# Patient Record
Sex: MALE | Race: WHITE | Employment: OTHER | URBAN - METROPOLITAN AREA
[De-identification: names, ages, dates, MRNs, and addresses within clinical notes are randomized per-mention and may not be internally consistent; named-entity substitution may affect disease eponyms.]

---

## 2021-08-01 ENCOUNTER — HOSPITAL ENCOUNTER (EMERGENCY)
Age: 56
Discharge: HOME | End: 2021-08-01
Attending: STUDENT IN AN ORGANIZED HEALTH CARE EDUCATION/TRAINING PROGRAM
Payer: MEDICARE

## 2021-08-01 ENCOUNTER — APPOINTMENT (OUTPATIENT)
Dept: GENERAL RADIOLOGY | Age: 56
End: 2021-08-01
Payer: MEDICARE

## 2021-08-01 VITALS
BODY MASS INDEX: 18.48 KG/M2 | WEIGHT: 132 LBS | HEIGHT: 71 IN | DIASTOLIC BLOOD PRESSURE: 74 MMHG | RESPIRATION RATE: 16 BRPM | SYSTOLIC BLOOD PRESSURE: 148 MMHG | TEMPERATURE: 98.2 F | OXYGEN SATURATION: 98 % | HEART RATE: 95 BPM

## 2021-08-01 DIAGNOSIS — E11.69 TYPE 2 DIABETES MELLITUS WITH OTHER SPECIFIED COMPLICATION, UNSPECIFIED WHETHER LONG TERM INSULIN USE (HCC): ICD-10-CM

## 2021-08-01 DIAGNOSIS — S91.101A OPEN WOUND OF RIGHT GREAT TOE, INITIAL ENCOUNTER: Primary | ICD-10-CM

## 2021-08-01 LAB
ABSOLUTE EOS #: 0.06 K/UL (ref 0–0.44)
ABSOLUTE IMMATURE GRANULOCYTE: 0.04 K/UL (ref 0–0.3)
ABSOLUTE LYMPH #: 1.51 K/UL (ref 1.1–3.7)
ABSOLUTE MONO #: 0.93 K/UL (ref 0.1–1.2)
ANION GAP SERPL CALCULATED.3IONS-SCNC: 14 MMOL/L (ref 9–17)
BASOPHILS # BLD: 1 % (ref 0–2)
BASOPHILS ABSOLUTE: 0.05 K/UL (ref 0–0.2)
BNP INTERPRETATION: ABNORMAL
BUN BLDV-MCNC: 18 MG/DL (ref 6–20)
BUN/CREAT BLD: 20 (ref 9–20)
C-REACTIVE PROTEIN: 47.2 MG/L (ref 0–5)
CALCIUM SERPL-MCNC: 9.2 MG/DL (ref 8.6–10.4)
CHLORIDE BLD-SCNC: 94 MMOL/L (ref 98–107)
CO2: 22 MMOL/L (ref 20–31)
CREAT SERPL-MCNC: 0.88 MG/DL (ref 0.7–1.2)
DIFFERENTIAL TYPE: ABNORMAL
EOSINOPHILS RELATIVE PERCENT: 1 % (ref 1–4)
GFR AFRICAN AMERICAN: >60 ML/MIN
GFR NON-AFRICAN AMERICAN: >60 ML/MIN
GFR SERPL CREATININE-BSD FRML MDRD: ABNORMAL ML/MIN/{1.73_M2}
GFR SERPL CREATININE-BSD FRML MDRD: ABNORMAL ML/MIN/{1.73_M2}
GLUCOSE BLD-MCNC: 463 MG/DL (ref 70–99)
HCT VFR BLD CALC: 34 % (ref 40.7–50.3)
HEMOGLOBIN: 11.1 G/DL (ref 13–17)
IMMATURE GRANULOCYTES: 1 %
LYMPHOCYTES # BLD: 18 % (ref 24–43)
MCH RBC QN AUTO: 30.3 PG (ref 25.2–33.5)
MCHC RBC AUTO-ENTMCNC: 32.6 G/DL (ref 28.4–34.8)
MCV RBC AUTO: 92.9 FL (ref 82.6–102.9)
MONOCYTES # BLD: 11 % (ref 3–12)
NRBC AUTOMATED: 0 PER 100 WBC
PDW BLD-RTO: 13 % (ref 11.8–14.4)
PLATELET # BLD: 198 K/UL (ref 138–453)
PLATELET ESTIMATE: ABNORMAL
PMV BLD AUTO: 10.6 FL (ref 8.1–13.5)
POTASSIUM SERPL-SCNC: 4.3 MMOL/L (ref 3.7–5.3)
PRO-BNP: 472 PG/ML
RBC # BLD: 3.66 M/UL (ref 4.21–5.77)
RBC # BLD: ABNORMAL 10*6/UL
SEDIMENTATION RATE, ERYTHROCYTE: 41 MM (ref 0–20)
SEG NEUTROPHILS: 68 % (ref 36–65)
SEGMENTED NEUTROPHILS ABSOLUTE COUNT: 5.88 K/UL (ref 1.5–8.1)
SODIUM BLD-SCNC: 130 MMOL/L (ref 135–144)
WBC # BLD: 8.5 K/UL (ref 3.5–11.3)
WBC # BLD: ABNORMAL 10*3/UL

## 2021-08-01 PROCEDURE — 6370000000 HC RX 637 (ALT 250 FOR IP): Performed by: STUDENT IN AN ORGANIZED HEALTH CARE EDUCATION/TRAINING PROGRAM

## 2021-08-01 PROCEDURE — 73630 X-RAY EXAM OF FOOT: CPT

## 2021-08-01 PROCEDURE — 80048 BASIC METABOLIC PNL TOTAL CA: CPT

## 2021-08-01 PROCEDURE — 83880 ASSAY OF NATRIURETIC PEPTIDE: CPT

## 2021-08-01 PROCEDURE — 85025 COMPLETE CBC W/AUTO DIFF WBC: CPT

## 2021-08-01 PROCEDURE — 99283 EMERGENCY DEPT VISIT LOW MDM: CPT

## 2021-08-01 PROCEDURE — 85652 RBC SED RATE AUTOMATED: CPT

## 2021-08-01 PROCEDURE — 86140 C-REACTIVE PROTEIN: CPT

## 2021-08-01 RX ORDER — INSULIN GLARGINE 100 [IU]/ML
INJECTION, SOLUTION SUBCUTANEOUS NIGHTLY
COMMUNITY

## 2021-08-01 RX ORDER — PREGABALIN 75 MG/1
75 CAPSULE ORAL 2 TIMES DAILY
COMMUNITY

## 2021-08-01 RX ORDER — DOXYCYCLINE 100 MG/1
100 CAPSULE ORAL ONCE
Status: COMPLETED | OUTPATIENT
Start: 2021-08-01 | End: 2021-08-01

## 2021-08-01 RX ORDER — MORPHINE SULFATE 15 MG/1
15 TABLET ORAL EVERY 4 HOURS PRN
COMMUNITY

## 2021-08-01 RX ORDER — PANCRELIPASE 36000; 180000; 114000 [USP'U]/1; [USP'U]/1; [USP'U]/1
CAPSULE, DELAYED RELEASE PELLETS ORAL
COMMUNITY

## 2021-08-01 RX ORDER — DOXYCYCLINE HYCLATE 100 MG
100 TABLET ORAL 2 TIMES DAILY
Qty: 14 TABLET | Refills: 0 | Status: SHIPPED | OUTPATIENT
Start: 2021-08-01 | End: 2021-08-08

## 2021-08-01 RX ADMIN — DOXYCYCLINE 100 MG: 100 CAPSULE ORAL at 23:13

## 2021-08-01 ASSESSMENT — ENCOUNTER SYMPTOMS
VOMITING: 0
EYE REDNESS: 0
SHORTNESS OF BREATH: 0
BACK PAIN: 0
COLOR CHANGE: 1
NAUSEA: 0
ABDOMINAL PAIN: 0
EYE DISCHARGE: 0
ABDOMINAL PAIN: 1

## 2021-08-01 NOTE — ED TRIAGE NOTES
Pt to ED via private auto c/o R toe ulcer. Pt AOx4. States h/o DM. Noticed swelling 3 days ago. Pt denies pain d/t neuropathy. Toe red and swollen. Patent airway, no dyspnea or cyanosis noted. Afebrille. Skin warm, appropriate to hue and dry. Ambulatory to  . Bed to lowest position, side rails up x2, call light within reach.

## 2021-08-02 NOTE — ED PROVIDER NOTES
[facility]  Emergency Department Encounter     Pt Name: Flavia Cobian  MRN: [id number]  [place] 1965  Date of evaluation: 8/1/21  PCP:  Michelle Brooks MD    [address] [place] [address]       Chief Complaint   Patient presents with    Foot Swelling       HISTORY [place]  (Location/Symptom, Timing/Onset, Context/Setting, Quality, Duration, Modifying Factors,Severity.)      Flavia Cobian is a 64 y.o. male who presents with right great toe pain. He states he is diabetic does have significant diabetic neuropathy in both lower extremities. States he noticed a wound to the medial great toe on the right foot couple days ago and recently scrubbed back callus overlying it today saw a hole and has noticed some worsening pain as well as redness. States last tetanus was approximately 2 years ago. Denies fevers, chills, nausea, vomiting chest pain. States he is never seen a podiatrist.  Pain is great toe. Worse with movement. PAST MEDICAL / SURGICAL / SOCIAL / FAMILY HISTORY      has a past medical history of Diabetes mellitus ([address] and Pancreatitis. has a past surgical history that includes Tonsillectomy and hernia repair.     Social History     Socioeconomic History    Marital status: Single     Spouse name: Not on file    Number of children: Not on file    Years of education: Not on file    Highest education level: Not on file   Occupational History    Not on file   Tobacco Use    Smoking status: Former Smoker     Types: Cigarettes    Smokeless tobacco: Never Used   Substance and Sexual Activity    Alcohol use: Not Currently    Drug use: Never    Sexual activity: Not on file   Other Topics Concern    Not on file   Social History Narrative    Not on file     Social Determinants of Health     Financial Resource Strain:     Difficulty of Paying Living Expenses:    Food Insecurity:     Worried About Running Out of Food in the Last Year:     [address] in the Last Year:    Transportation Needs:     Lack of Transportation (Medical):  Lack of Transportation (Non-Medical):    Physical Activity:     Days of Exercise per Week:     Minutes of Exercise per Session:    Stress:     Feeling of Stress :    Social Connections:     Frequency of Communication with Friends and Family:     Frequency of Social Gatherings with Friends and Family:     Attends Anabaptism Services:     Active Member of Clubs or Organizations:     Attends Club or Organization Meetings:     Marital Status:    Intimate Partner Violence:     Fear of Current or Ex-Partner:     Emotionally Abused:     Physically Abused:     Sexually Abused:        History reviewed. No pertinent family history. Allergies:  Patient has no known allergies. Home Medications:  Prior to Admission medications    Medication Sig Start Date End Date Taking? Authorizing Provider   morphine (MSIR) 15 MG tablet Take 15 mg by mouth every 4 hours as needed for Pain. Yes Historical Provider, MD   pregabalin (LYRICA) 75 MG capsule Take 75 mg by mouth 2 times daily. Yes Historical Provider, MD   lipase-protease-amylase (CREON) 60772-535549 units CPEP delayed release capsule Take by mouth 3 times daily (with meals)   Yes Historical Provider, MD   insulin glargine (BASAGLAR KWIKPEN) 100 UNIT/ML injection pen Inject into the skin nightly   Yes Historical Provider, MD   insulin aspart (NOVOLOG) 100 UNIT/ML injection vial Inject into the skin 3 times daily (before meals)   Yes Historical Provider, MD   doxycycline hyclate (VIBRA-TABS) 100 MG tablet Take 1 tablet by mouth 2 times daily for 7 days 8/1/21 8/8/21 Yes Caitlin Navarro, DO       REVIEW OF SYSTEMS    (2-9 systems for level 4, 10 or more for level 5)      Review of Systems   Constitutional: Negative for chills and fever. Eyes: Negative for discharge and redness. Respiratory: Negative for shortness of breath. Cardiovascular: Negative for chest pain. Gastrointestinal: Negative for abdominal pain. Genitourinary: Negative for flank pain. Musculoskeletal: Negative for back pain. Skin: Positive for rash and wound. Neurological: Negative for headaches. Psychiatric/Behavioral: Negative for agitation and confusion. PHYSICAL EXAM   (up to 7 for level 4, 8 or more for level 5)     INITIAL VITALS:    height is 5' 11\" (1.803 m) and weight is 132 lb (59.9 kg). His oral temperature is 98.2 °F (36.8 °C). His blood pressure is 148/74 (abnormal) and his pulse is 95. His respiration is 16 and oxygen saturation is 98%. Physical Exam  Vitals and nursing note reviewed. Constitutional:       Appearance: He is well-developed. HENT:      Head: Normocephalic and atraumatic. Nose: Nose normal.      Mouth/Throat:      Mouth: Mucous membranes are moist.   Eyes:      General: No scleral icterus. Conjunctiva/sclera: Conjunctivae normal.      Pupils: Pupils are equal, round, and reactive to light. Neck:      Trachea: No tracheal deviation. Cardiovascular:      Rate and Rhythm: Normal rate and regular rhythm. Heart sounds: Normal heart sounds. No murmur heard. No friction rub. No gallop. Pulmonary:      Effort: Pulmonary effort is normal. No respiratory distress. Breath sounds: Normal breath sounds. No wheezing or rales. Abdominal:      General: Bowel sounds are normal. There is no distension. Palpations: Abdomen is soft. There is no mass. Tenderness: There is no abdominal tenderness. There is no guarding or rebound. Musculoskeletal:         General: Normal range of motion. Cervical back: Neck supple. Skin:     General: Skin is warm and dry. Findings: Erythema present. No rash.       Comments: Right great toe wound with small amount of drainage, significant surrounding cellulitis with tracking up dorsal aspect of foot, no palpable crepitus, very minimal sensation in foot, pulses intact distally   Neurological: Mental Status: He is alert and oriented to person, place, and time. Psychiatric:         Behavior: Behavior normal.                     DIFFERENTIAL  DIAGNOSIS     PLAN (LABS / IMAGING / EKG):  Orders Placed This Encounter   Procedures    Foot Boot    XR FOOT RIGHT (MIN 3 VIEWS)    CBC Auto Differential    Sedimentation Rate    C-Reactive Protein    Brain Natriuretic Peptide    Basic Metabolic Panel    Partial Weight Bearing    Inpatient consult to [address] [place] [address] consult to Podiatry       MEDICATIONS ORDERED:  Orders Placed This Encounter   Medications    DISCONTD: insulin regular (HUMULIN R;NOVOLIN R) injection 10 Units    doxycycline monohydrate (MONODOX) capsule 100 mg     Order Specific Question:   Antimicrobial Indications     Answer:   Skin and Soft Tissue Infection    doxycycline hyclate (VIBRA-TABS) 100 MG tablet     Sig: Take 1 tablet by mouth 2 times daily for 7 days     Dispense:  14 tablet     Refill:  0       DDX: Diabetic foot wound versus abscess versus osteomyelitis versus hyperglycemia    Initial MDM/Plan: 64 y.o. male who presents with concerning wound to right great toe. Significant surrounding cellulitis with some tracking up the dorsal aspect. Patient denies ever seeing podiatrist in the past.  Will discuss with podiatry. Basic labs. Right foot x-ray. Patient with no systemic symptoms no constitutional symptoms with no fever. Alert and oriented. Normal vital signs.     DIAGNOSTIC RESULTS / EMERGENCY DEPARTMENT COURSE / MDM     LABS:  Labs Reviewed   CBC WITH AUTO DIFFERENTIAL - Abnormal; Notable for the following components:       Result Value    RBC 3.66 (*)     Hemoglobin 11.1 (*)     Hematocrit 34.0 (*)     Seg Neutrophils 68 (*)     Lymphocytes 18 (*)     Immature Granulocytes 1 (*)     All other components within normal limits   SEDIMENTATION RATE - Abnormal; Notable for the following components:    Sed Rate 41 (*)     All other components within normal limits C-REACTIVE PROTEIN - Abnormal; Notable for the following components:    CRP 47.2 (*)     All other components within normal limits   BRAIN NATRIURETIC PEPTIDE - Abnormal; Notable for the following components:    Pro- (*)     All other components within normal limits   BASIC METABOLIC PANEL - Abnormal; Notable for the following components:    Glucose 463 (*)     Sodium 130 (*)     Chloride 94 (*)     All other components within normal limits         RADIOLOGY:  XR FOOT RIGHT (MIN 3 VIEWS)    Result Date: 8/1/2021  EXAMINATION: THREE XRAY VIEWS OF THE RIGHT FOOT 8/1/2021 4:13 pm COMPARISON: None. HISTORY: ORDERING SYSTEM PROVIDED HISTORY: Great toe puncture wound, diabetic, eval for subcu gas versus signs of osteo TECHNOLOGIST PROVIDED HISTORY: Great toe puncture wound, diabetic, eval for subcu gas versus signs of osteo Reason for Exam: Pt states redness, swelling x 1 day, unsure of injury Acuity: Acute Type of Exam: Initial FINDINGS: The visualized bones are normal.  There is no evidence of fracture or dislocation. Severe degenerative changes of the 1st metatarsophalangeal joint. The remaining joint spaces appear well maintained. The soft tissues are unremarkable. No acute bony abnormalities are noted Severe degenerative changes of the 1st metatarsophalangeal joint. EMERGENCY DEPARTMENT COURSE:  ED Course as of Aug 02 0304   Nancy Sheriff Aug 01, 2021   2041 Case discussed with podiatry resident. They are going to [facility] V's will be over soon as possible. Will get labs. [MS]   2126 Podiatry resident stated about 45 minutes to arrive. [MS]   6523 Podiatry residents seeing patient. [MS]      ED Course User Index  [MS] Chalino Ledezma, DO     Extensive discussion had with patient as well as podiatry team. Debridement performed at bedside by podiatrist. No pus or tracking noted. Agreeable to discharge. Prescription for doxycycline given. Follow-up arranged with podiatry.  Encouraged to return for any worsening signs or symptoms. Worsening redness, tracking, fever, chills. Patient voiced understanding plan. Has supply of insulin at home for his hyperglycemia. Agreeable to discharge. · Based on the low acuity of concerning symptoms and improvement of symptoms, patient will be discharged with follow up and prescription information listed in the Disposition section. · Patient states they will follow-up with primary care physician and/or return to the emergency department should they experience a change or worsening of symptoms. · Patient will be discharged with resources: summary of visit, instructions, follow-up information, prescriptions if necessary and clinics available. · Patient/ family instructed to read discharge paperwork. All of their questions and concerns were addressed. · Suspicion for any acute life-threatening processes is low. Patient voices understanding of plan. PROCEDURES:  None    CONSULTS:  IP CONSULT TO PODIATRY  IP CONSULT TO PODIATRY    CRITICAL CARE:  0    FINAL IMPRESSION      1. Open wound of right great toe, initial encounter    2.  Type 2 diabetes mellitus with other specified complication, unspecified whether long term insulin use ([facility]          DISPOSITION / [facility] TO:  [facility] 3  [phone]    On 8/4/2021  Wound Check right foot      DISCHARGE MEDICATIONS:  Discharge Medication List as of 8/1/2021 10:57 PM      START taking these medications    Details   doxycycline hyclate (VIBRA-TABS) 100 MG tablet Take 1 tablet by mouth 2 times daily for 7 days, Disp-14 tablet, R-0Print             Sue Chairez DO  EmergencyMedicine Attending    (Please note that portions of this note were completed with a voice recognition program.  Efforts were made to edit the dictations but occasionally words are mis-transcribed.)       Sue Chairez DO  08/02/21 7598

## 2021-08-02 NOTE — CONSULTS
Consultation Note  Podiatric Medicine and Surgery     Subjective     Chief Complaint: Swelling of the great toe, right foot    HPI:  Kary Virgen is a 64 y.o. diabetic male seen at [facility] ED for swelling of the right great toe. Pt states that he noticed that his callus had increased in size 4 days ago and tried cleaning it at home and was later advised by his friends to visit the ER based on the appearance. He states he does not remember traumatizing the area anytime recently. He denies any pain to the right sub hallux and states that he has very little sensation at the bottom of his feet. Relates history of diabetes, unsure of most recent HgbA1c. Currently follows with an endocrinologist at [facility]. Pt denies any other pedal complaints. Pt denies any n/v/f/c/sob. PCP is Ana Laura Gutierrez MD    ROS:   Review of Systems   Constitutional: Negative. Negative for chills and fever. Gastrointestinal: Positive for abdominal pain. Negative for nausea and vomiting. Musculoskeletal: Negative for arthralgias and myalgias. Skin: Positive for color change and wound. Neurological: Positive for numbness. Past Medical History   has a past medical history of Diabetes mellitus ([address] and Pancreatitis. Past Surgical History   has a past surgical history that includes Tonsillectomy and hernia repair. Medications  Prior to Admission medications    Medication Sig Start Date End Date Taking? Authorizing Provider   morphine (MSIR) 15 MG tablet Take 15 mg by mouth every 4 hours as needed for Pain. Yes Historical Provider, MD   pregabalin (LYRICA) 75 MG capsule Take 75 mg by mouth 2 times daily.    Yes Historical Provider, MD   lipase-protease-amylase (CREON) 49480-956898 units CPEP delayed release capsule Take by mouth 3 times daily (with meals)   Yes Historical Provider, MD   insulin glargine (BASAGLAR KWIKPEN) 100 UNIT/ML injection pen Inject into the skin nightly   Yes Historical Provider, MD insulin aspart (NOVOLOG) 100 UNIT/ML injection vial Inject into the skin 3 times daily (before meals)   Yes Historical Provider, MD   doxycycline hyclate (VIBRA-TABS) 100 MG tablet Take 1 tablet by mouth 2 times daily for 7 days 21 Yes Giuliana Render, DO    Scheduled Meds:   doxycycline monohydrate  100 mg Oral Once     Continuous Infusions:  PRN Meds:. Allergies  has No Known Allergies. Family History  family history is not on file. Social History   reports that he has quit smoking. His smoking use included cigarettes. He has never used smokeless tobacco.   reports previous alcohol use. reports no history of drug use. Objective     Vitals:  Patient Vitals for the past 8 hrs:   BP Temp Temp src Pulse Resp SpO2 Height Weight   21 1718 (!) 148/74 98.2 °F (36.8 °C) Oral 95 16 98 % 5' 11\" (1.803 m) 132 lb (59.9 kg)     Average, Min, and Max for last 24 hours Vitals:  TEMPERATURE:  Temp  Av.2 °F (36.8 °C)  Min: 98.2 °F (36.8 °C)  Max: 98.2 °F (36.8 °C)    RESPIRATIONS RANGE: Resp  Av  Min: 16  Max: 16    PULSE RANGE: Pulse  Av  Min: 95  Max: 95    BLOOD PRESSURE RANGE:  Systolic (58JUL), JFR:615 , Min:148 , NZO:704   ; Diastolic (83JUL), WGY:57, Min:74, Max:74      PULSE OXIMETRY RANGE: SpO2  Av %  Min: 98 %  Max: 98 %  I&O:  No intake/output data recorded. CBC:  Recent Labs     21  1840   WBC 8.5   HGB 11.1*   HCT 34.0*           BMP:  Recent Labs     21  1840   *   K 4.3   CL 94*   CO2 22   BUN 18   CREATININE 0.88   GLUCOSE 463*   CALCIUM 9.2        Coags:  No results for input(s): APTT, PROT, INR in the last 72 hours. Lab Results   Component Value Date    LABA1C 7.1 (H) 2012     Lab Results   Component Value Date    SEDRATE 41 (H) 2021     No results found for: CRP      Lower Extremity Physical Exam:  Vascular: DP and PT pulses are palpable. CFT <3 seconds to all digits.   Hair growth is normal to the level of the digits. Right foot was noticeably warmer that the left foot. No edema noted to feet, bilaterally. Neuro: Saph/sural/SP/DP/plantar sensation to light touch is not intact. Musculoskeletal: Muscle strength is 5/5 to all lower extremity muscle groups. Gross deformity is not visible. Dermatologic: Partial thickness ulcer #1 located to the sub hallux of the right foot and measures approximately 2cm x 1.8cm x 0.4cm. Base is fibronecrotic with slough noted at the center of the wound. Periwound skin is callused and thickened. No drainage noted to the puncture site with associated mal odor. Erythema noted to the right foot and ankle with associated increase in warmth. Does not probe to bone or undermine but there is 0.3 cm sinus tracking noted to the distal plantar aspect of the ulcer. No fluctuance, crepitus, or induration. Interdigital maceration absent. Clinical:               Imaging:   XR FOOT RIGHT (MIN 3 VIEWS)   Final Result   No acute bony abnormalities are noted      Severe degenerative changes of the 1st metatarsophalangeal joint. Assessment     Pedro Flores is a 64 y.o. male with   1. Ulceration to sub right hallux  2. DM2 associated with peripheral neuropathy     Active Problems:    * No active hospital problems. *  Resolved Problems:    * No resolved hospital problems. *        Plan     · Patient seen and evaluated at bedside at [facility]  · Treatment options were discussed in detail with the patient and decided on following up outpatient with Dr. Tao Carlton  · Radiographs reviewed and discussed in detail with patient. · Sharp excisional debridement was performed to the level of sub cutaneous tissue via # 15 blade. 100% of wound debrided. No anesthesia required. Hemostasis obtained with direct pressure. Patient reports 0/10 post procedure pain.         · Dressing applied to Right foot: Saline soaked 4x4 gauze, dry 4x4 gauze, kerlix and ACE  · Pt given doxycycline in ED  · Pt educated on tight glycemic control, change in diet, diabetic foot exam daily and routine diabetic foot care with a podiatrist. Patient also educated on how to perform daily dressing changes. · Discussed with Dr. Quoc Shafer DPM   Podiatric Medicine & Surgery   8/1/2021 at 10:57 PM    Senior Resident Statement:    I have discussed the case, including pertinent history and exam findings with the intern. I agree with the assessment, plan and orders as documented by the intern. Any changes were made in the note above by me.       Electronically signed by José Manuel Palafox DPM on 8/1/2021 at 11:47 PM

## 2021-08-25 ENCOUNTER — ANESTHESIA EVENT (OUTPATIENT)
Dept: OPERATING ROOM | Age: 56
End: 2021-08-25
Payer: MEDICARE

## 2021-08-25 ENCOUNTER — HOSPITAL ENCOUNTER (OUTPATIENT)
Age: 56
Setting detail: OUTPATIENT SURGERY
Discharge: HOME | End: 2021-08-25
Attending: PODIATRIST | Admitting: PODIATRIST
Payer: MEDICARE

## 2021-08-25 ENCOUNTER — ANESTHESIA (OUTPATIENT)
Dept: OPERATING ROOM | Age: 56
End: 2021-08-25
Payer: MEDICARE

## 2021-08-25 VITALS
RESPIRATION RATE: 19 BRPM | HEIGHT: 71 IN | OXYGEN SATURATION: 98 % | SYSTOLIC BLOOD PRESSURE: 150 MMHG | TEMPERATURE: 97.2 F | WEIGHT: 135 LBS | DIASTOLIC BLOOD PRESSURE: 84 MMHG | HEART RATE: 78 BPM | BODY MASS INDEX: 18.9 KG/M2

## 2021-08-25 VITALS — DIASTOLIC BLOOD PRESSURE: 74 MMHG | TEMPERATURE: 95.7 F | OXYGEN SATURATION: 100 % | SYSTOLIC BLOOD PRESSURE: 124 MMHG

## 2021-08-25 LAB
GLUCOSE BLD-MCNC: 175 MG/DL (ref 75–110)
GLUCOSE BLD-MCNC: 250 MG/DL (ref 75–110)

## 2021-08-25 PROCEDURE — 3600000004 HC SURGERY LEVEL 4 BASE: Performed by: PODIATRIST

## 2021-08-25 PROCEDURE — 6360000002 HC RX W HCPCS: Performed by: NURSE ANESTHETIST, CERTIFIED REGISTERED

## 2021-08-25 PROCEDURE — 2580000003 HC RX 258: Performed by: PODIATRIST

## 2021-08-25 PROCEDURE — 7100000001 HC PACU RECOVERY - ADDTL 15 MIN: Performed by: PODIATRIST

## 2021-08-25 PROCEDURE — 93005 ELECTROCARDIOGRAM TRACING: CPT | Performed by: ANESTHESIOLOGY

## 2021-08-25 PROCEDURE — 7100000040 HC SPAR PHASE II RECOVERY - FIRST 15 MIN: Performed by: PODIATRIST

## 2021-08-25 PROCEDURE — 2709999900 HC NON-CHARGEABLE SUPPLY: Performed by: PODIATRIST

## 2021-08-25 PROCEDURE — 3700000001 HC ADD 15 MINUTES (ANESTHESIA): Performed by: PODIATRIST

## 2021-08-25 PROCEDURE — 3600000014 HC SURGERY LEVEL 4 ADDTL 15MIN: Performed by: PODIATRIST

## 2021-08-25 PROCEDURE — 7100000000 HC PACU RECOVERY - FIRST 15 MIN: Performed by: PODIATRIST

## 2021-08-25 PROCEDURE — 3700000000 HC ANESTHESIA ATTENDED CARE: Performed by: PODIATRIST

## 2021-08-25 PROCEDURE — 2500000003 HC RX 250 WO HCPCS: Performed by: NURSE ANESTHETIST, CERTIFIED REGISTERED

## 2021-08-25 PROCEDURE — 2580000003 HC RX 258: Performed by: NURSE ANESTHETIST, CERTIFIED REGISTERED

## 2021-08-25 PROCEDURE — 2500000003 HC RX 250 WO HCPCS: Performed by: PODIATRIST

## 2021-08-25 PROCEDURE — 88305 TISSUE EXAM BY PATHOLOGIST: CPT

## 2021-08-25 PROCEDURE — 6360000002 HC RX W HCPCS: Performed by: ANESTHESIOLOGY

## 2021-08-25 PROCEDURE — 88311 DECALCIFY TISSUE: CPT

## 2021-08-25 PROCEDURE — 82947 ASSAY GLUCOSE BLOOD QUANT: CPT

## 2021-08-25 RX ORDER — SODIUM CHLORIDE, SODIUM LACTATE, POTASSIUM CHLORIDE, CALCIUM CHLORIDE 600; 310; 30; 20 MG/100ML; MG/100ML; MG/100ML; MG/100ML
INJECTION, SOLUTION INTRAVENOUS CONTINUOUS
Status: CANCELLED | OUTPATIENT
Start: 2021-08-25

## 2021-08-25 RX ORDER — OXYCODONE HYDROCHLORIDE AND ACETAMINOPHEN 5; 325 MG/1; MG/1
2 TABLET ORAL PRN
Status: DISCONTINUED | OUTPATIENT
Start: 2021-08-25 | End: 2021-08-25 | Stop reason: HOSPADM

## 2021-08-25 RX ORDER — SODIUM CHLORIDE, SODIUM LACTATE, POTASSIUM CHLORIDE, CALCIUM CHLORIDE 600; 310; 30; 20 MG/100ML; MG/100ML; MG/100ML; MG/100ML
INJECTION, SOLUTION INTRAVENOUS CONTINUOUS PRN
Status: DISCONTINUED | OUTPATIENT
Start: 2021-08-25 | End: 2021-08-25 | Stop reason: SDUPTHER

## 2021-08-25 RX ORDER — GLYCOPYRROLATE 1 MG/5 ML
SYRINGE (ML) INTRAVENOUS PRN
Status: DISCONTINUED | OUTPATIENT
Start: 2021-08-25 | End: 2021-08-25 | Stop reason: SDUPTHER

## 2021-08-25 RX ORDER — EPHEDRINE SULFATE/0.9% NACL/PF 50 MG/5 ML
SYRINGE (ML) INTRAVENOUS PRN
Status: DISCONTINUED | OUTPATIENT
Start: 2021-08-25 | End: 2021-08-25 | Stop reason: SDUPTHER

## 2021-08-25 RX ORDER — FENTANYL CITRATE 50 UG/ML
25 INJECTION, SOLUTION INTRAMUSCULAR; INTRAVENOUS EVERY 5 MIN PRN
Status: DISCONTINUED | OUTPATIENT
Start: 2021-08-25 | End: 2021-08-25 | Stop reason: HOSPADM

## 2021-08-25 RX ORDER — LABETALOL HYDROCHLORIDE 5 MG/ML
5 INJECTION, SOLUTION INTRAVENOUS EVERY 10 MIN PRN
Status: DISCONTINUED | OUTPATIENT
Start: 2021-08-25 | End: 2021-08-25 | Stop reason: HOSPADM

## 2021-08-25 RX ORDER — LIDOCAINE HYDROCHLORIDE 10 MG/ML
INJECTION, SOLUTION EPIDURAL; INFILTRATION; INTRACAUDAL; PERINEURAL PRN
Status: DISCONTINUED | OUTPATIENT
Start: 2021-08-25 | End: 2021-08-25 | Stop reason: SDUPTHER

## 2021-08-25 RX ORDER — OXYCODONE HYDROCHLORIDE AND ACETAMINOPHEN 5; 325 MG/1; MG/1
1 TABLET ORAL PRN
Status: DISCONTINUED | OUTPATIENT
Start: 2021-08-25 | End: 2021-08-25 | Stop reason: HOSPADM

## 2021-08-25 RX ORDER — CEFAZOLIN SODIUM 2 G/50ML
SOLUTION INTRAVENOUS PRN
Status: DISCONTINUED | OUTPATIENT
Start: 2021-08-25 | End: 2021-08-25 | Stop reason: SDUPTHER

## 2021-08-25 RX ORDER — MIDAZOLAM HYDROCHLORIDE 2 MG/2ML
0.5 INJECTION, SOLUTION INTRAMUSCULAR; INTRAVENOUS 4 TIMES DAILY PRN
Status: COMPLETED | OUTPATIENT
Start: 2021-08-25 | End: 2021-08-25

## 2021-08-25 RX ORDER — KETOROLAC TROMETHAMINE 30 MG/ML
INJECTION, SOLUTION INTRAMUSCULAR; INTRAVENOUS PRN
Status: DISCONTINUED | OUTPATIENT
Start: 2021-08-25 | End: 2021-08-25 | Stop reason: SDUPTHER

## 2021-08-25 RX ORDER — ONDANSETRON 2 MG/ML
4 INJECTION INTRAMUSCULAR; INTRAVENOUS
Status: DISCONTINUED | OUTPATIENT
Start: 2021-08-25 | End: 2021-08-25 | Stop reason: HOSPADM

## 2021-08-25 RX ORDER — PROPOFOL 10 MG/ML
INJECTION, EMULSION INTRAVENOUS PRN
Status: DISCONTINUED | OUTPATIENT
Start: 2021-08-25 | End: 2021-08-25 | Stop reason: SDUPTHER

## 2021-08-25 RX ORDER — MORPHINE SULFATE 2 MG/ML
2 INJECTION, SOLUTION INTRAMUSCULAR; INTRAVENOUS EVERY 5 MIN PRN
Status: DISCONTINUED | OUTPATIENT
Start: 2021-08-25 | End: 2021-08-25 | Stop reason: HOSPADM

## 2021-08-25 RX ORDER — FENTANYL CITRATE 50 UG/ML
INJECTION, SOLUTION INTRAMUSCULAR; INTRAVENOUS PRN
Status: DISCONTINUED | OUTPATIENT
Start: 2021-08-25 | End: 2021-08-25 | Stop reason: SDUPTHER

## 2021-08-25 RX ORDER — ONDANSETRON 2 MG/ML
INJECTION INTRAMUSCULAR; INTRAVENOUS PRN
Status: DISCONTINUED | OUTPATIENT
Start: 2021-08-25 | End: 2021-08-25 | Stop reason: SDUPTHER

## 2021-08-25 RX ORDER — MAGNESIUM HYDROXIDE 1200 MG/15ML
LIQUID ORAL CONTINUOUS PRN
Status: COMPLETED | OUTPATIENT
Start: 2021-08-25 | End: 2021-08-25

## 2021-08-25 RX ORDER — BUPIVACAINE HYDROCHLORIDE 5 MG/ML
INJECTION, SOLUTION EPIDURAL; INTRACAUDAL PRN
Status: DISCONTINUED | OUTPATIENT
Start: 2021-08-25 | End: 2021-08-25 | Stop reason: ALTCHOICE

## 2021-08-25 RX ORDER — DIPHENHYDRAMINE HYDROCHLORIDE 50 MG/ML
12.5 INJECTION INTRAMUSCULAR; INTRAVENOUS
Status: DISCONTINUED | OUTPATIENT
Start: 2021-08-25 | End: 2021-08-25 | Stop reason: HOSPADM

## 2021-08-25 RX ADMIN — CEFAZOLIN SODIUM 2000 MG: 2 SOLUTION INTRAVENOUS at 12:53

## 2021-08-25 RX ADMIN — PROPOFOL 200 MG: 10 INJECTION, EMULSION INTRAVENOUS at 12:50

## 2021-08-25 RX ADMIN — FENTANYL CITRATE 25 MCG: 50 INJECTION, SOLUTION INTRAMUSCULAR; INTRAVENOUS at 13:44

## 2021-08-25 RX ADMIN — Medication 10 MG: at 13:20

## 2021-08-25 RX ADMIN — MIDAZOLAM HYDROCHLORIDE 0.5 MG: 1 INJECTION, SOLUTION INTRAMUSCULAR; INTRAVENOUS at 12:16

## 2021-08-25 RX ADMIN — Medication 0.2 MG: at 13:17

## 2021-08-25 RX ADMIN — MIDAZOLAM HYDROCHLORIDE 0.5 MG: 1 INJECTION, SOLUTION INTRAMUSCULAR; INTRAVENOUS at 12:00

## 2021-08-25 RX ADMIN — LIDOCAINE HYDROCHLORIDE 50 MG: 10 INJECTION, SOLUTION EPIDURAL; INFILTRATION; INTRACAUDAL; PERINEURAL at 12:50

## 2021-08-25 RX ADMIN — KETOROLAC TROMETHAMINE 30 MG: 30 INJECTION, SOLUTION INTRAMUSCULAR at 13:44

## 2021-08-25 RX ADMIN — ONDANSETRON 4 MG: 2 INJECTION, SOLUTION INTRAMUSCULAR; INTRAVENOUS at 12:59

## 2021-08-25 RX ADMIN — MIDAZOLAM HYDROCHLORIDE 0.5 MG: 1 INJECTION, SOLUTION INTRAMUSCULAR; INTRAVENOUS at 12:10

## 2021-08-25 RX ADMIN — MIDAZOLAM HYDROCHLORIDE 0.5 MG: 1 INJECTION, SOLUTION INTRAMUSCULAR; INTRAVENOUS at 12:02

## 2021-08-25 RX ADMIN — SODIUM CHLORIDE, POTASSIUM CHLORIDE, SODIUM LACTATE AND CALCIUM CHLORIDE: 600; 310; 30; 20 INJECTION, SOLUTION INTRAVENOUS at 13:22

## 2021-08-25 RX ADMIN — SODIUM CHLORIDE, POTASSIUM CHLORIDE, SODIUM LACTATE AND CALCIUM CHLORIDE: 600; 310; 30; 20 INJECTION, SOLUTION INTRAVENOUS at 12:44

## 2021-08-25 ASSESSMENT — PULMONARY FUNCTION TESTS
PIF_VALUE: 12
PIF_VALUE: 11
PIF_VALUE: 15
PIF_VALUE: 2
PIF_VALUE: 12
PIF_VALUE: 11
PIF_VALUE: 17
PIF_VALUE: 12
PIF_VALUE: 1
PIF_VALUE: 18
PIF_VALUE: 12
PIF_VALUE: 12
PIF_VALUE: 7
PIF_VALUE: 1
PIF_VALUE: 4
PIF_VALUE: 4
PIF_VALUE: 10
PIF_VALUE: 9
PIF_VALUE: 13
PIF_VALUE: 12
PIF_VALUE: 1
PIF_VALUE: 12
PIF_VALUE: 3
PIF_VALUE: 12
PIF_VALUE: 2
PIF_VALUE: 13
PIF_VALUE: 11
PIF_VALUE: 1
PIF_VALUE: 12
PIF_VALUE: 9
PIF_VALUE: 9
PIF_VALUE: 7
PIF_VALUE: 12
PIF_VALUE: 14
PIF_VALUE: 9
PIF_VALUE: 12
PIF_VALUE: 17
PIF_VALUE: 10
PIF_VALUE: 12
PIF_VALUE: 0
PIF_VALUE: 9
PIF_VALUE: 12
PIF_VALUE: 12
PIF_VALUE: 9
PIF_VALUE: 12
PIF_VALUE: 2
PIF_VALUE: 12
PIF_VALUE: 12
PIF_VALUE: 0
PIF_VALUE: 12
PIF_VALUE: 10
PIF_VALUE: 15
PIF_VALUE: 9
PIF_VALUE: 12
PIF_VALUE: 2
PIF_VALUE: 2
PIF_VALUE: 1
PIF_VALUE: 4
PIF_VALUE: 16
PIF_VALUE: 12
PIF_VALUE: 5
PIF_VALUE: 12
PIF_VALUE: 1
PIF_VALUE: 12

## 2021-08-25 ASSESSMENT — PAIN SCALES - GENERAL
PAINLEVEL_OUTOF10: 0

## 2021-08-25 ASSESSMENT — LIFESTYLE VARIABLES: SMOKING_STATUS: 0

## 2021-08-25 NOTE — ANESTHESIA POSTPROCEDURE EVALUATION
Department of Anesthesiology  Postprocedure Note    Patient: Starla Koch  MRN: [id number]  YOB: 1965  Date of evaluation: 8/25/2021  Time:  5:16 PM     Procedure Summary     Date: 08/25/21 Room / Location: [address]    Anesthesia Start: 1244 Anesthesia Stop: 6000    Procedure: FILLET RIGHT HALLUX (Right Toes) Diagnosis: (OSTEOMYELOTITS RIGHT HALLUX)    Surgeons: Jose De Jesus Zuñiga DPM Responsible Provider: Eugenio Valles MD    Anesthesia Type: MAC ASA Status: 4          Anesthesia Type: MAC    Jovany Phase I: Jovany Score: 10    Jovany Phase II: Jovany Score: 10    Last vitals: Reviewed and per EMR flowsheets.        Anesthesia Post Evaluation    Patient location during evaluation: PACU  Patient participation: complete - patient participated  Level of consciousness: awake and alert  Pain score: 4  Airway patency: patent  Nausea & Vomiting: no nausea and no vomiting  Complications: no  Cardiovascular status: hemodynamically stable  Respiratory status: room air  Hydration status: euvolemic

## 2021-08-25 NOTE — OP NOTE
OP NOTE     PATIENT NAME: Abdon Hernandez DATE: 1965  -  64 y.o. male  MRN: [id number]  DATE: 8/25/2021  BILLING #: [id number]     Surgeon(s):  Brannon Parker DPM      ASSISTANTS: Glenda Booker DPM      PRE-OP DIAGNOSIS:   1. Osteomyelitis, right foot   2. Cellulitis, right foot  3. Chronic non-healing ulceration, right foot   4. Diabetes mellitus type II with associated peripheral neuropathy     POST-OP DIAGNOSIS: Same as above.     PROCEDURE:   1. Fillet of hallux with flap closure of defect, right foot      ANESTHESIA: General, 10cc 0.5% Marcaine plain     HEMOSTASIS: Direct pressure     ESTIMATED BLOOD LOSS: Roughly 20cc.     MATERIALS: None  * No implants in log *     INJECTABLES: None     SPECIMEN:   ID Type Source Tests Collected by Time Destination   A : RIGHT BIG TOE Bone Toe SURGICAL PATHOLOGY Brannon Parker DPM 8/25/2021 5823           COMPLICATIONS: None     FINDINGS: Osteomyelitis of hallux. Underlying 1st metatarsal appears healthy and viable. Amputation site closed. Please see op note for full detail. The patient was counseled at length about the risks of katelynn Covid-19 during their perioperative period and any recovery window from their procedure.  The patient was made aware that katelynn Covid-19  may worsen their prognosis for recovering from their procedure  and lend to a higher morbidity and/or mortality risk.  All material risks, benefits, and reasonable alternatives including postponing the procedure were discussed. The patient does wish to proceed with the procedure at this time. INDICATION FOR PROCEDURE: The patient has had an infection of the right hallux for some time. Patient recently presented to the office with increased cellulitis, drainage, exposed bone to the right hallux. He was placed on oral antibiotics and infection has persisted. Bone biopsy was obtained at that time which was positive for osteomyelitis.   Surgical intervention is indicated at this time for infection control, limb salvage, wound healing of the right lower extremity. All risks and benefits discussed with the patient in detail. It was discussed with the patient in detail given their compromised vascular status that they are at high risk of further amputation and or limb loss. No guarantees were given or implied. Consent signed and in the chart. PROCEDURE IN DETAIL: The patient was transported from pre-op to the operating room and placed on the operating table in the supine position with a safety strap across the lap. After adequate sedation by the Anesthesia, a local block of 10cc of 0.5% Marcaine plain was injected. A well-padded pneumatic tourniquet was applied to the right ankle. A timeout was performed confirming the correct patient, correct procedure, correct site, preoperative antibiotics, everyone in the room was in agreement. The right lower extremity was then prepped, scrubbed, and draped in the usual aseptic fashion. A tourniquet was not inflated at this time an attempt to prevent vascular insult. Attention was directed to the right hallux. There was a large full-thickness ulceration to plantar of the hallux which probed to bone. The ulceration measured approximately 5 cm x 3.5 cm x 0.5 cm. There is noted to be significant soft tissue loss. A #15 blade was used to create two converging semieliptical incisions around the proximal phalanx encompassing the ulceration in the incision and preserving as much soft tissue as possible. The hallux was disarticulated at the MPJ and passed from the table to be sent as specimen. All nonviable soft tissue was removed using a combination of sharp excision and rongeur. The underlying first metatarsal appeared healthy without signs of osteomyelitis. The remaining soft tissues appear viable with bleeding noted and without signs of infection or necrosis. The surgical incision was extended medially as well as plantarly. A thick plantar flap was meticulously dissected out, care was taken to preserve the plantar medial neurovascular bundle and its pedicle attachments. The flap measured approximately 6.0 cm x 3.5 cm. The flap was then mobilized from plantar medial to dorsal lateral.  There appeared to be adequate flap to allow for closure with appropriate tension. Surgical site was then flushed with copious amounts of sterile saline. At this time the surgical site was closed in layers utilizing 3-0 Monocryl for deep closure and  3-0 nylon for skin closure. Minimal tension was noted across the flap. Dressings consisted of adaptic, 4 x 4s, Kerlix and ACE bandage were applied. The patient tolerated the above procedure and anesthesia well without complications. The patient was transported from the operating room to the PACU with vital signs stable and vascular status intact to the right foot.     Munira Sandoval DPM   Podiatric Medicine & Surgery   8/25/2021 at 3:31 PM

## 2021-08-25 NOTE — BRIEF OP NOTE
BRIEF OP NOTE    PATIENT NAME: Dixie Montelongo  YOB: 1965  -  64 y.o. male  MRN: [id number]  DATE: 8/25/2021  BILLING #: [id number]    Surgeon(s):  Jorge Carter DPM     ASSISTANTS: Liana Mcbride DPM     PRE-OP DIAGNOSIS:   1. Osteomyelitis, right foot   2. Cellulitis, right foot  3. Chronic non-healing ulceration, right foot   4. Diabetes mellitus type II with associated peripheral neuropathy    POST-OP DIAGNOSIS: Same as above. PROCEDURE:   1. Fillet of hallux with flap closure of defect, right foot     ANESTHESIA: General, 10cc 0.5% Marcaine plain    HEMOSTASIS: Direct pressure    ESTIMATED BLOOD LOSS: Roughly 20cc. MATERIALS: None  * No implants in log *    INJECTABLES: None    SPECIMEN:   ID Type Source Tests Collected by Time Destination   A : RIGHT BIG TOE Bone Toe SURGICAL PATHOLOGY Jorge Carter DPM 8/25/2021 0032        COMPLICATIONS: None    FINDINGS: Osteomyelitis of hallux. Underlying 1st metatarsal appears healthy and viable. Amputation site closed. Please see op note for full detail. The patient was counseled at length about the risks of katelynn Covid-19 during their perioperative period and any recovery window from their procedure. The patient was made aware that katelynn Covid-19  may worsen their prognosis for recovering from their procedure  and lend to a higher morbidity and/or mortality risk. All material risks, benefits, and reasonable alternatives including postponing the procedure were discussed. The patient does wish to proceed with the procedure at this time.     Liana Mcbride DPM   Podiatric Medicine & Surgery   8/25/2021 at 1:50 PM

## 2021-08-25 NOTE — H&P
History and Physical    Pt Name: Emilia Oakley  MRN: [id number]  YOB: 1965  Date of evaluation: 8/25/2021  Primary Care Physician: Patience Seay MD    SUBJECTIVE:   History of Chief Complaint:    Emilia Oakley is a 64 y.o. male who is scheduled today for FILLET HALLUX FLAP - Right. Patient reports about a month ago he stopped on a sharp object with his right foot, causing a puncture to his right toe. Patient reports it had originally began to heal but then became very infected. He has a history of diabetic neuropathy to bilateral feet and reports no pain to the site. He does state it is mildly draining a serosanguanous fluid that he has wrapped with gauze. Allergies  has No Known Allergies. Medications  Prior to Admission medications    Medication Sig Start Date End Date Taking? Authorizing Provider   morphine (MSIR) 15 MG tablet Take 15 mg by mouth every 4 hours as needed for Pain. Yes Historical Provider, MD   pregabalin (LYRICA) 75 MG capsule Take 75 mg by mouth 2 times daily. Yes Historical Provider, MD   lipase-protease-amylase (CREON) 35880-371441 units CPEP delayed release capsule Take by mouth 3 times daily (with meals)   Yes Historical Provider, MD   insulin glargine (BASAGLAR KWIKPEN) 100 UNIT/ML injection pen Inject into the skin nightly Sliding scale   Yes Historical Provider, MD   insulin aspart (NOVOLOG) 100 UNIT/ML injection vial Inject into the skin 3 times daily (before meals) Sliding scale   Yes Historical Provider, MD     Past Medical History    has a past medical history of Abdominal pain, Diabetes mellitus ([address] Diarrhea, Pain, Pancreatitis, Skin abnormality, and Wellness examination. Past Surgical History   has a past surgical history that includes Tonsillectomy; hernia repair; Hand surgery (Bilateral); Elbow surgery (Right); other surgical history (Bilateral); and Colonoscopy. Social History   reports that he quit smoking about 8 years ago.  His smoking use included cigarettes. He smoked 0.50 packs per day. He has never used smokeless tobacco.   reports previous alcohol use. reports no history of drug use. Marital Status single  Children none  Occupation disability  Family History  Family Status   Relation Name Status    Mother  Alive   Brenda Her Father  Alive     family history is not on file. Review of Systems:  CONSTITUTIONAL:   negative for fevers, chills, fatigue and malaise    EYES:   negative for double vision, blurred vision and photophobia    HEENT:   negative for tinnitus, epistaxis and sore throat     RESPIRATORY:   negative for cough, shortness of breath, wheezing     CARDIOVASCULAR:   negative for chest pain, palpitations, syncope, edema     GASTROINTESTINAL:   negative for nausea, vomiting     GENITOURINARY:   negative for incontinence     MUSCULOSKELETAL:   negative for neck or back pain     NEUROLOGICAL:   Negative for weakness and tingling  negative for headaches and dizziness     PSYCHIATRIC:   negative for anxiety       OBJECTIVE:   VITALS:  height is 5' 11\" (1.803 m) and weight is 135 lb (61.2 kg). His temporal temperature is 97.2 °F (36.2 °C). His blood pressure is 146/77 (abnormal) and his pulse is 82. His respiration is 20 and oxygen saturation is 100%. CONSTITUTIONAL:alert & oriented x 3, no acute distress. Calm and pleasant. SKIN:  Warm and dry, no rashes to exposed areas of skin. HEAD:  Normocephalic, atraumatic. EYES: PERRL. EOMs intact. Wearing glasses. EARS:  Intact and equal bilaterally. No edema or thickening, without lumps, lesions, or discharge. Hearing grossly WNL. NOSE:  Nares patent. No rhinorrhea. MOUTH/THROAT:  Mucous membranes pink and moist, uvula midline, teeth appear to be intact, several missing. NECK: Supple, no lymphadenopathy. LUNGS: Respirations even and non-labored. Clear to auscultation bilaterally, no wheezes, rales, or rhonchi. CARDIOVASCULAR: Regular rate and rhythm, no murmurs/rubs/gallops.    ABDOMEN: soft, non-tender and non-distended, bowel sounds active x 4. EXTREMITIES: 2+ edema to right ankle, right toe is wrapped with gauze; CDI. No varicosities to bilateral lower extremities. NEUROLOGIC: CN II-XII are grossly intact. Gait not assessed. IMPRESSIONS:   OSTEOMYELOTITS RIGHT HALLUX  PLANS:   FILLET HALLUX FLAP - Right.     TEODORO Stallings - CNP   Electronically signed 8/25/2021 at 10:17 AM

## 2021-08-25 NOTE — ANESTHESIA PRE PROCEDURE
taken time: 21 1000  Max: 82   Max taken time: 21 1000  Resp  Av  Min: 21   Min taken time: 21 1000  Max: 20   Max taken time: 21 1000  BP  Min: 146/77   Min taken time: 21 1000  Max: 146/77   Max taken time: 21 1000  SpO2  Av %  Min: 100 %   Min taken time: 21 1000  Max: 100 %   Max taken time: 21 1000  BP Readings from Last 3 Encounters:   21 (!) 146/77   21 (!) 148/74       BMI  Body mass index is 18.83 kg/m².     CBC   Lab Results   Component Value Date    WBC 8.5 2021    RBC 3.66 2021    RBC 3.79 2012    HGB 11.1 2021    HCT 34.0 2021    MCV 92.9 2021    RDW 13.0 2021     2021     2012       CMP    Lab Results   Component Value Date     2021    K 4.3 2021    CL 94 2021    CO2 22 2021    BUN 18 2021    CREATININE 0.88 2021    GFRAA >60 2021    LABGLOM >60 2021    GLUCOSE 463 2021    GLUCOSE 215 2012    CALCIUM 9.2 2021    BILITOT 0.50 2012    ALKPHOS 230 2012    AST 33 2012    ALT 16 2012       BMP    Lab Results   Component Value Date     2021    K 4.3 2021    CL 94 2021    CO2 22 2021    BUN 18 2021    CREATININE 0.88 2021    CALCIUM 9.2 2021    GFRAA >60 2021    LABGLOM >60 2021    GLUCOSE 463 2021    GLUCOSE 215 2012       POC Testing  Recent Labs     21  1020   POCGLU 250*       Coags  No results found for: PROTIME, INR, APTT    HCG (If Applicable) No results found for: PREGTESTUR, PREGSERUM, HCG, HCGQUANT     ABGs No results found for: PHART, PO2ART, BJL0QVQ, GGQ4JOB, BEART, M8CPRCNT     Type & Screen (If Applicable)  No results found for: LABABO, [address]    Radiology (If Applicable)    Cardiac Testing (If Applicable)     EKG (If Applicable) RVH,IRBBB          Medications prior to admission:   Prior to Admission medications    Medication Sig Start Date End Date Taking? Authorizing Provider   morphine (MSIR) 15 MG tablet Take 15 mg by mouth every 4 hours as needed for Pain. Yes Historical Provider, MD   pregabalin (LYRICA) 75 MG capsule Take 75 mg by mouth 2 times daily. Yes Historical Provider, MD   lipase-protease-amylase (CREON) 76052-038915 units CPEP delayed release capsule Take by mouth 3 times daily (with meals)   Yes Historical Provider, MD   insulin glargine (BASAGLAR KWIKPEN) 100 UNIT/ML injection pen Inject into the skin nightly Sliding scale   Yes Historical Provider, MD   insulin aspart (NOVOLOG) 100 UNIT/ML injection vial Inject into the skin 3 times daily (before meals) Sliding scale   Yes Historical Provider, MD       Current medications:    No current facility-administered medications for this encounter. Allergies:  No Known Allergies    Problem List:  There is no problem list on file for this patient. Past Medical History:        Diagnosis Date    Abdominal pain     Diabetes mellitus ([address]     type 1/ Dr. Cj Palumbo [facility]/ endocrine/last seen     Diarrhea     Pain     arms/hands bilat.     Pancreatitis     Skin abnormality     open wound currently right great toe/drainage    Wellness examination     PCP Kush Pineda MD/ [facility]/ last seen        Past Surgical History:        Procedure Laterality Date    COLONOSCOPY      ELBOW SURGERY Right     HAND SURGERY Bilateral     multiple    HERNIA REPAIR      OTHER SURGICAL HISTORY Bilateral     thoracic outlet    TONSILLECTOMY         Social History:    Social History     Tobacco Use    Smoking status: Former Smoker     Packs/day: 0.50     Types: Cigarettes     Quit date: 2013     Years since quittin.0    Smokeless tobacco: Never Used   Substance Use Topics    Alcohol use: Not Currently                                Counseling given: Not Answered      Vital Signs (Current):   Vitals: 08/24/21 0831 08/25/21 1000   BP:  (!) 146/77   Pulse:  82   Resp:  20   Temp:  97.2 °F (36.2 °C)   TempSrc:  Temporal   SpO2:  100%   Weight: 135 lb (61.2 kg) 135 lb (61.2 kg)   Height: 5' 11\" (1.803 m) 5' 11\" (1.803 m)                                              BP Readings from Last 3 Encounters:   08/25/21 (!) 146/77   08/01/21 (!) 148/74       NPO Status: Time of last liquid consumption: 2000                        Time of last solid consumption: 2000                        Date of last liquid consumption: 08/24/21                        Date of last solid food consumption: 08/24/21    BMI:   Wt Readings from Last 3 Encounters:   08/25/21 135 lb (61.2 kg)   08/01/21 132 lb (59.9 kg)     Body mass index is 18.83 kg/m². CBC:   Lab Results   Component Value Date    WBC 8.5 08/01/2021    RBC 3.66 08/01/2021    RBC 3.79 03/28/2012    HGB 11.1 08/01/2021    HCT 34.0 08/01/2021    MCV 92.9 08/01/2021    RDW 13.0 08/01/2021     08/01/2021     03/28/2012       CMP:   Lab Results   Component Value Date     08/01/2021    K 4.3 08/01/2021    CL 94 08/01/2021    CO2 22 08/01/2021    BUN 18 08/01/2021    CREATININE 0.88 08/01/2021    GFRAA >60 08/01/2021    LABGLOM >60 08/01/2021    GLUCOSE 463 08/01/2021    GLUCOSE 215 03/30/2012    CALCIUM 9.2 08/01/2021    BILITOT 0.50 03/30/2012    ALKPHOS 230 03/30/2012    AST 33 03/30/2012    ALT 16 03/30/2012       POC Tests: No results for input(s): POCGLU, POCNA, POCK, POCCL, POCBUN, POCHEMO, POCHCT in the last 72 hours.     Coags: No results found for: PROTIME, INR, APTT    HCG (If Applicable): No results found for: PREGTESTUR, PREGSERUM, HCG, HCGQUANT     ABGs: No results found for: PHART, PO2ART, MMQ4JZQ, RXK5LRV, BEART, V2TYQNAT     Type & Screen (If Applicable):  No results found for: LABABO, LABRH    Drug/Infectious Status (If Applicable):  No results found for: HIV, HEPCAB    COVID-19 Screening (If Applicable): No results found for:

## 2021-08-26 LAB
EKG ATRIAL RATE: 75 BPM
EKG P AXIS: 78 DEGREES
EKG P-R INTERVAL: 162 MS
EKG Q-T INTERVAL: 392 MS
EKG QRS DURATION: 106 MS
EKG QTC CALCULATION (BAZETT): 437 MS
EKG R AXIS: -92 DEGREES
EKG T AXIS: 69 DEGREES
EKG VENTRICULAR RATE: 75 BPM

## 2021-08-26 PROCEDURE — 93010 ELECTROCARDIOGRAM REPORT: CPT | Performed by: INTERNAL MEDICINE

## 2021-08-30 LAB — SURGICAL PATHOLOGY REPORT: NORMAL

## (undated) DEVICE — GLOVE ORTHO 8   MSG9480

## (undated) DEVICE — Z DISCONTINUED BY MEDLINE USE 2711682 TRAY SKIN PREP DRY W/ PREM GLV

## (undated) DEVICE — SVMMC POD PK

## (undated) DEVICE — TOURNIQUET CUF BLD PRESSURE 4X18 IN 2 PRT SINGLE BLDR RED

## (undated) DEVICE — YANKAUER,FLEXIBLE HANDLE,REGLR CAPACITY: Brand: MEDLINE INDUSTRIES, INC.

## (undated) DEVICE — NEEDLE FLTR 18GA L1.5IN MEM THK5UM BLNT DISP

## (undated) DEVICE — INTENDED FOR TISSUE SEPARATION, AND OTHER PROCEDURES THAT REQUIRE A SHARP SURGICAL BLADE TO PUNCTURE OR CUT.: Brand: BARD-PARKER ® CARBON RIB-BACK BLADES

## (undated) DEVICE — GLOVE ORANGE PI 8 1/2   MSG9085

## (undated) DEVICE — BANDAGE,GAUZE,BULKEE II,4.5"X4.1YD,STRL: Brand: MEDLINE

## (undated) DEVICE — SUTURE NONABSORBABLE MONOFILAMENT 3-0 PS-1 18 IN BLK ETHILON 1663H

## (undated) DEVICE — GLOVE ORANGE PI 8   MSG9080

## (undated) DEVICE — SUTURE MCRYL SZ 3-0 L27IN ABSRB UD L24MM PS-1 3/8 CIR PRIM Y936H

## (undated) DEVICE — SOLUTION SCRB 4OZ 4% CHG H2O AIDED FOR PREOPERATIVE SKIN

## (undated) DEVICE — SYRINGE MED 10ML LUERLOCK TIP W/O SFTY DISP

## (undated) DEVICE — SUTURE MCRYL SZ 2-0 L27IN ABSRB UD SH L26MM TAPERPOINT NDL Y417H